# Patient Record
Sex: FEMALE | Race: WHITE | NOT HISPANIC OR LATINO | Employment: UNEMPLOYED | URBAN - NONMETROPOLITAN AREA
[De-identification: names, ages, dates, MRNs, and addresses within clinical notes are randomized per-mention and may not be internally consistent; named-entity substitution may affect disease eponyms.]

---

## 2023-04-18 ENCOUNTER — OFFICE VISIT (OUTPATIENT)
Dept: PRIMARY CARE | Facility: CLINIC | Age: 63
End: 2023-04-18
Payer: MEDICARE

## 2023-04-18 VITALS
OXYGEN SATURATION: 98 % | DIASTOLIC BLOOD PRESSURE: 74 MMHG | HEART RATE: 73 BPM | BODY MASS INDEX: 21.71 KG/M2 | WEIGHT: 118 LBS | HEIGHT: 62 IN | SYSTOLIC BLOOD PRESSURE: 112 MMHG

## 2023-04-18 DIAGNOSIS — R21 SKIN RASH: Primary | ICD-10-CM

## 2023-04-18 PROBLEM — K64.9 HEMORRHOID: Status: ACTIVE | Noted: 2023-04-18

## 2023-04-18 PROBLEM — M25.512 ACUTE PAIN OF LEFT SHOULDER: Status: ACTIVE | Noted: 2023-04-18

## 2023-04-18 PROBLEM — M79.641 PAIN OF RIGHT HAND: Status: ACTIVE | Noted: 2023-04-18

## 2023-04-18 PROBLEM — L25.5 CONTACT DERMATITIS DUE TO PLANT: Status: ACTIVE | Noted: 2023-04-18

## 2023-04-18 PROBLEM — M75.102 LEFT ROTATOR CUFF TEAR: Status: ACTIVE | Noted: 2023-04-18

## 2023-04-18 PROBLEM — K58.9 IBS (IRRITABLE BOWEL SYNDROME): Status: ACTIVE | Noted: 2023-04-18

## 2023-04-18 PROBLEM — E03.9 HYPOTHYROIDISM: Status: ACTIVE | Noted: 2023-04-18

## 2023-04-18 PROBLEM — R05.3 CHRONIC COUGH: Status: ACTIVE | Noted: 2023-04-18

## 2023-04-18 PROBLEM — N88.2 STENOSIS OF CERVIX: Status: ACTIVE | Noted: 2023-04-18

## 2023-04-18 PROBLEM — E78.5 HYPERLIPIDEMIA: Status: ACTIVE | Noted: 2023-04-18

## 2023-04-18 PROBLEM — R35.1 NOCTURIA: Status: ACTIVE | Noted: 2023-04-18

## 2023-04-18 PROBLEM — R53.83 FATIGUE: Status: ACTIVE | Noted: 2023-04-18

## 2023-04-18 PROCEDURE — 1160F RVW MEDS BY RX/DR IN RCRD: CPT | Performed by: FAMILY MEDICINE

## 2023-04-18 PROCEDURE — 1159F MED LIST DOCD IN RCRD: CPT | Performed by: FAMILY MEDICINE

## 2023-04-18 PROCEDURE — 1036F TOBACCO NON-USER: CPT | Performed by: FAMILY MEDICINE

## 2023-04-18 PROCEDURE — 99213 OFFICE O/P EST LOW 20 MIN: CPT | Performed by: FAMILY MEDICINE

## 2023-04-18 RX ORDER — TRIAMCINOLONE ACETONIDE 1 MG/G
CREAM TOPICAL 2 TIMES DAILY
Qty: 80 G | Refills: 1 | Status: SHIPPED | OUTPATIENT
Start: 2023-04-18

## 2023-04-18 RX ORDER — PREDNISONE 10 MG/1
TABLET ORAL
Qty: 30 TABLET | Refills: 1 | Status: SHIPPED | OUTPATIENT
Start: 2023-04-18 | End: 2023-04-30

## 2023-04-18 RX ORDER — LEVOTHYROXINE SODIUM 75 UG/1
1 TABLET ORAL DAILY
COMMUNITY
Start: 2020-02-14 | End: 2023-08-25

## 2023-04-18 RX ORDER — ACETAMINOPHEN AND CODEINE PHOSPHATE 300; 30 MG/1; MG/1
TABLET ORAL EVERY 6 HOURS
COMMUNITY
Start: 2020-02-14

## 2023-04-18 ASSESSMENT — ENCOUNTER SYMPTOMS
NAIL CHANGES: 0
DIARRHEA: 0
VOMITING: 0
SHORTNESS OF BREATH: 0
FATIGUE: 0
RHINORRHEA: 0
COUGH: 0
FEVER: 0
SORE THROAT: 0
EYE PAIN: 0
ANOREXIA: 0

## 2023-04-18 ASSESSMENT — PATIENT HEALTH QUESTIONNAIRE - PHQ9
1. LITTLE INTEREST OR PLEASURE IN DOING THINGS: NOT AT ALL
2. FEELING DOWN, DEPRESSED OR HOPELESS: NOT AT ALL
SUM OF ALL RESPONSES TO PHQ9 QUESTIONS 1 AND 2: 0

## 2023-04-18 NOTE — PROGRESS NOTES
"Subjective   Patient ID: Delaney Ross is a 63 y.o. female who presents for Rash (X month).    Rash  This is a recurrent problem. The current episode started more than 1 month ago. The problem is unchanged. The affected locations include the chest, torso, back, abdomen, left shoulder, left axilla, left arm, right shoulder, right axilla, right arm and right elbow. The rash is characterized by redness and itchiness. She was exposed to chemicals. Pertinent negatives include no anorexia, congestion, cough, diarrhea, eye pain, facial edema, fatigue, fever, joint pain, nail changes, rhinorrhea, shortness of breath, sore throat or vomiting.      2 other members of her family have had a similar rash.  There are no URI symptoms in anyone.  But they had a similar guttate psoriasis like rash.  Also possible that it is just dry skin from using the hot tub.  But I do not think the hot tub use is new.    Scaly slightly raised macular rash over the trunk arms and neck area.    Prednisone taper  As needed triamcinolone  Would not use the hot tub for the next 2 weeks  Lotion daily.    Review of Systems   Constitutional:  Negative for fatigue and fever.   HENT:  Negative for congestion, rhinorrhea and sore throat.    Eyes:  Negative for pain.   Respiratory:  Negative for cough and shortness of breath.    Gastrointestinal:  Negative for anorexia, diarrhea and vomiting.   Musculoskeletal:  Negative for joint pain.   Skin:  Positive for rash. Negative for nail changes.       Objective   /74   Pulse 73   Ht 1.575 m (5' 2\")   Wt 53.5 kg (118 lb)   SpO2 98%   BMI 21.58 kg/m²     Physical Exam  Constitutional:       Appearance: Normal appearance.   HENT:      Head: Normocephalic and atraumatic.   Skin:     General: Skin is warm and dry.   Neurological:      General: No focal deficit present.      Mental Status: She is alert and oriented to person, place, and time.      Gait: Gait normal.   Psychiatric:         Mood and Affect: " Mood normal.         Behavior: Behavior normal.         Thought Content: Thought content normal.         Judgment: Judgment normal.         Assessment/Plan   Problem List Items Addressed This Visit          Other    Skin rash - Primary

## 2023-05-10 ENCOUNTER — TELEPHONE (OUTPATIENT)
Dept: PRIMARY CARE | Facility: CLINIC | Age: 63
End: 2023-05-10
Payer: MEDICARE

## 2023-05-10 DIAGNOSIS — R21 SKIN RASH: ICD-10-CM

## 2023-05-10 NOTE — TELEPHONE ENCOUNTER
RASH IS BACK, HAS ROUND PATCHES THIS TIME.   RASH CAME BACK AFTER BENG IN HIT TUB 1 OR 2 DAYS LATER...  THIS TIME HAS ROUND PATCHES.  [facility]

## 2023-08-22 DIAGNOSIS — E03.9 HYPOTHYROIDISM, UNSPECIFIED TYPE: ICD-10-CM

## 2023-08-25 RX ORDER — LEVOTHYROXINE SODIUM 75 UG/1
75 TABLET ORAL DAILY
Qty: 90 TABLET | Refills: 1 | Status: SHIPPED | OUTPATIENT
Start: 2023-08-25 | End: 2024-03-05

## 2023-12-05 DIAGNOSIS — K64.9 HEMORRHOIDS, UNSPECIFIED HEMORRHOID TYPE: ICD-10-CM

## 2023-12-05 RX ORDER — HYDROCORTISONE ACETATE PRAMOXINE HCL 2.5; 1 G/100G; G/100G
CREAM TOPICAL 2 TIMES DAILY
Qty: 30 G | Refills: 1 | Status: SHIPPED | OUTPATIENT
Start: 2023-12-05 | End: 2024-02-09 | Stop reason: SDUPTHER

## 2023-12-05 RX ORDER — HYDROCORTISONE ACETATE PRAMOXINE HCL 2.5; 1 G/100G; G/100G
CREAM TOPICAL 2 TIMES DAILY
COMMUNITY
End: 2023-12-05 | Stop reason: SDUPTHER

## 2023-12-21 ENCOUNTER — APPOINTMENT (OUTPATIENT)
Dept: SURGERY | Facility: CLINIC | Age: 63
End: 2023-12-21
Payer: MEDICARE

## 2024-01-22 ENCOUNTER — APPOINTMENT (OUTPATIENT)
Dept: OBSTETRICS AND GYNECOLOGY | Facility: CLINIC | Age: 64
End: 2024-01-22
Payer: MEDICARE

## 2024-01-26 ENCOUNTER — APPOINTMENT (OUTPATIENT)
Dept: OBSTETRICS AND GYNECOLOGY | Facility: CLINIC | Age: 64
End: 2024-01-26
Payer: MEDICARE

## 2024-02-07 ENCOUNTER — OFFICE VISIT (OUTPATIENT)
Dept: OBSTETRICS AND GYNECOLOGY | Facility: CLINIC | Age: 64
End: 2024-02-07
Payer: MEDICARE

## 2024-02-07 VITALS
WEIGHT: 121.8 LBS | HEIGHT: 62 IN | BODY MASS INDEX: 22.41 KG/M2 | SYSTOLIC BLOOD PRESSURE: 114 MMHG | DIASTOLIC BLOOD PRESSURE: 62 MMHG

## 2024-02-07 DIAGNOSIS — Z01.419 ENCOUNTER FOR GYNECOLOGICAL EXAMINATION WITHOUT ABNORMAL FINDING: ICD-10-CM

## 2024-02-07 DIAGNOSIS — Z12.4 ENCOUNTER FOR SCREENING FOR CERVICAL CANCER: ICD-10-CM

## 2024-02-07 DIAGNOSIS — Z12.31 ENCOUNTER FOR SCREENING MAMMOGRAM FOR BREAST CANCER: ICD-10-CM

## 2024-02-07 PROCEDURE — 88175 CYTOPATH C/V AUTO FLUID REDO: CPT

## 2024-02-07 PROCEDURE — 1036F TOBACCO NON-USER: CPT | Performed by: OBSTETRICS & GYNECOLOGY

## 2024-02-07 PROCEDURE — 1159F MED LIST DOCD IN RCRD: CPT | Performed by: OBSTETRICS & GYNECOLOGY

## 2024-02-07 PROCEDURE — G0101 CA SCREEN;PELVIC/BREAST EXAM: HCPCS | Performed by: OBSTETRICS & GYNECOLOGY

## 2024-02-07 PROCEDURE — 1160F RVW MEDS BY RX/DR IN RCRD: CPT | Performed by: OBSTETRICS & GYNECOLOGY

## 2024-02-07 NOTE — PROGRESS NOTES
"Delaney Ross is a 63 y.o. female who is here for a routine exam. PCP = Khris Cummings MD    Chief Complaint   Patient presents with    Gynecologic Exam     Patient is here for yearly exam and pap test. Patient does not do regular self breast exams and has no concerns at this time. LMP: Menopause.          Presents for annual exam. She voices no complaints and is doing well. Denies any bowel or bladder problems. Denies any breast problems.  Denies any postmenopausal bleeding.    OB History          0    Para   0    Term   0       0    AB   0    Living   0         SAB   0    IAB   0    Ectopic   0    Multiple   0    Live Births   0                  Past Medical History:   Diagnosis Date    Asymptomatic menopausal state     History of menopause    Encounter for gynecological examination (general) (routine) without abnormal findings 2023    Cotest negative    Other conditions influencing health status     Menstruation    Personal history of other medical treatment 2023    History of mammogram       Past Surgical History:   Procedure Laterality Date    OTHER SURGICAL HISTORY  2020    Finger surgical procedure    OTHER SURGICAL HISTORY  2020    Ankle surgery    OTHER SURGICAL HISTORY  10/25/2022    Colonoscopy    OTHER SURGICAL HISTORY  2022    Rotator cuff repair       Past med hx and past surg hx reviewed and notable for: none    Review of Systems:   Constitutional: No fever or chills  Respiratory: No shortness of breath, or cough  Cardiovascular: No chest pain or syncope  Breasts: No breast pain, no masses, no nipple discharge  Gastrointestinal: No nausea, vomiting, or diarrhea, no abdominal pain  Genitourinary: No dysuria or frequency  Gynecology: Negative except as noted in history of present illness  All other: All other systems reviewed and negative for complaint    Objective   /62   Ht 1.575 m (5' 2\")   Wt 55.2 kg (121 lb 12.8 oz)   BMI 22.28 kg/m² "     PHYSICAL EXAMINATION:  Well-developed, well nourished, in no acute distress, alert and oriented x three, is pleasant and cooperative.   HEENT: Clear. Pupils equal, round and reactive to light and accommodation. Extraocular muscles are intact. Oral mucosa pink without exudate.   NECK: No lymphadenopathy, no thyromegaly.  BREASTS: Symmetric, no palpable masses. No nipple discharge or retraction.  LUNGS: Clear bilaterally.  HEART: Regular rate and rhythm without murmurs.  ABDOMEN: Normoactive bowel sounds, soft and nontender, no guarding or rebound tenderness, no CVA tenderness.  EXTREMITIES: No clubbing, cyanosis or edema.  NEUROLOGIC:  Cranial nerves II-XII grossly intact.  :  Normal external female genitalia, normal vulva, normal vagina. Normal urethral meatus, urethra and bladder. Normal appearing cervix. Normal-sized uterus, no adnexal masses or tenderness. Pap smear performed today.      Actions performed during this visit include:  - Clinical breast exam  - Clinical pelvic exam  -   Orders Placed This Encounter   Procedures    BI mammo bilateral screening tomosynthesis     Standing Status:   Future     Standing Expiration Date:   4/7/2025     Order Specific Question:   Reason for exam:     Answer:   Screening     Order Specific Question:   Radiologist to Determine Optimal Study     Answer:   Yes     Order Specific Question:   [facility]     Answer:   Immediate [1]     Order Specific Question:   Is this exam part of a Research Study? If Yes, link this order to the research study     Answer:   No        Problem List Items Addressed This Visit    None  Visit Diagnoses       Encounter for gynecological examination without abnormal finding        Relevant Orders    THINPREP PAP TEST    Encounter for screening for cervical cancer        Relevant Orders    THINPREP PAP TEST    Encounter for screening mammogram for breast cancer        Relevant Orders    BI mammo bilateral screening tomosynthesis              Provider Impression:  1.  Annual  2.  Screening mammogram    Thank you for coming to your annual exam. Your findings during the exam were normal.  Please return for your next visit in 2 years.

## 2024-02-09 ENCOUNTER — OFFICE VISIT (OUTPATIENT)
Dept: PRIMARY CARE | Facility: CLINIC | Age: 64
End: 2024-02-09
Payer: MEDICARE

## 2024-02-09 VITALS
OXYGEN SATURATION: 97 % | WEIGHT: 120.4 LBS | HEART RATE: 87 BPM | DIASTOLIC BLOOD PRESSURE: 73 MMHG | BODY MASS INDEX: 22.16 KG/M2 | SYSTOLIC BLOOD PRESSURE: 114 MMHG | HEIGHT: 62 IN

## 2024-02-09 DIAGNOSIS — K64.9 HEMORRHOIDS, UNSPECIFIED HEMORRHOID TYPE: ICD-10-CM

## 2024-02-09 DIAGNOSIS — R14.0 ABDOMINAL BLOATING: Primary | ICD-10-CM

## 2024-02-09 DIAGNOSIS — K58.9 IRRITABLE BOWEL SYNDROME, UNSPECIFIED TYPE: ICD-10-CM

## 2024-02-09 PROCEDURE — 1036F TOBACCO NON-USER: CPT | Performed by: FAMILY MEDICINE

## 2024-02-09 PROCEDURE — 1159F MED LIST DOCD IN RCRD: CPT | Performed by: FAMILY MEDICINE

## 2024-02-09 PROCEDURE — 99214 OFFICE O/P EST MOD 30 MIN: CPT | Performed by: FAMILY MEDICINE

## 2024-02-09 PROCEDURE — 1160F RVW MEDS BY RX/DR IN RCRD: CPT | Performed by: FAMILY MEDICINE

## 2024-02-09 RX ORDER — HYDROCORTISONE ACETATE PRAMOXINE HCL 2.5; 1 G/100G; G/100G
CREAM TOPICAL 2 TIMES DAILY
Qty: 30 G | Refills: 3 | Status: SHIPPED | OUTPATIENT
Start: 2024-02-09

## 2024-02-09 ASSESSMENT — PATIENT HEALTH QUESTIONNAIRE - PHQ9
2. FEELING DOWN, DEPRESSED OR HOPELESS: NOT AT ALL
1. LITTLE INTEREST OR PLEASURE IN DOING THINGS: NOT AT ALL
SUM OF ALL RESPONSES TO PHQ9 QUESTIONS 1 AND 2: 0

## 2024-02-09 ASSESSMENT — ENCOUNTER SYMPTOMS
NAUSEA: 0
BLOOD IN STOOL: 0
BACK PAIN: 0
COUGH: 0
NERVOUS/ANXIOUS: 0
DYSURIA: 0
ABDOMINAL PAIN: 1
FATIGUE: 0
CHILLS: 0
HEMATURIA: 0
FEVER: 0
CONSTIPATION: 0
SHORTNESS OF BREATH: 0
DIARRHEA: 0
ABDOMINAL DISTENTION: 1

## 2024-02-09 NOTE — PROGRESS NOTES
"Subjective   Patient ID: Delaney Ross is a 63 y.o. female who presents for Abdominal Pain (x1wk).    Abdominal Pain  Pertinent negatives include no constipation, diarrhea, dysuria, fever, hematuria or nausea.      Upper abdomen discomfort for 2 weeks.  When she does eat certain things like broccoli she does get some distention of her belly.  She also has increased flatulence when that happens.  No change in the abdominal pain when eating urination bowel movement cough deep breath twisting bending.  No rashes on the skin no thoracic back pain.  No nausea vomiting diarrhea constipation, no hematuria or hematochezia.  No melena.    On exam very mild tenderness epigastric area, no rebound.    Sounds like possible viral bug that is very mild.  She is going to work on drinking plenty of fluids lighter foods and avoid things that cause the gassiness.  Pepcid 20 mg twice a day as needed.  If she uses it gets relief maybe she should take it twice a day every day for a while.  No labs etc. at this time  She will call sooner if things get worse, she will also call in 2 weeks if things or not better.    Review of Systems   Constitutional:  Negative for chills, fatigue and fever.   Respiratory:  Negative for cough and shortness of breath.    Gastrointestinal:  Positive for abdominal distention and abdominal pain. Negative for blood in stool, constipation, diarrhea and nausea.   Genitourinary:  Negative for dysuria and hematuria.   Musculoskeletal:  Negative for back pain.   Skin:  Negative for rash.   Psychiatric/Behavioral:  The patient is not nervous/anxious.        Objective   /73   Pulse 87   Ht 1.575 m (5' 2\")   Wt 54.6 kg (120 lb 6.4 oz)   SpO2 97%   BMI 22.02 kg/m²     Physical Exam  Constitutional:       Appearance: Normal appearance.   HENT:      Head: Normocephalic and atraumatic.   Cardiovascular:      Rate and Rhythm: Normal rate and regular rhythm.      Heart sounds: Normal heart sounds.   Pulmonary: "      Effort: Pulmonary effort is normal.      Breath sounds: Normal breath sounds.   Abdominal:      General: There is no distension.      Palpations: Abdomen is soft. There is no mass.      Tenderness: There is abdominal tenderness. There is no guarding or rebound.   Skin:     General: Skin is warm and dry.   Neurological:      General: No focal deficit present.      Mental Status: She is alert and oriented to person, place, and time.   Psychiatric:         Mood and Affect: Mood normal.         Behavior: Behavior normal.         Thought Content: Thought content normal.         Judgment: Judgment normal.         Assessment/Plan   Problem List Items Addressed This Visit             ICD-10-CM    Hemorrhoid K64.9    IBS (irritable bowel syndrome) K58.9     Other Visit Diagnoses         Codes    Abdominal bloating    -  Primary R14.0

## 2024-02-22 LAB
CYTOLOGY CMNT CVX/VAG CYTO-IMP: NORMAL
LAB AP HPV GENOTYPE QUESTION: YES
LAB AP HPV HR: NORMAL
LABORATORY COMMENT REPORT: NORMAL
MENSTRUAL HX REPORTED: NORMAL
PATH REPORT.TOTAL CANCER: NORMAL

## 2024-02-27 ENCOUNTER — APPOINTMENT (OUTPATIENT)
Dept: ORTHOPEDIC SURGERY | Facility: CLINIC | Age: 64
End: 2024-02-27
Payer: MEDICARE

## 2024-02-27 DIAGNOSIS — E03.9 HYPOTHYROIDISM, UNSPECIFIED TYPE: ICD-10-CM

## 2024-03-05 RX ORDER — LEVOTHYROXINE SODIUM 75 UG/1
75 TABLET ORAL DAILY
Qty: 90 TABLET | Refills: 3 | Status: SHIPPED | OUTPATIENT
Start: 2024-03-05 | End: 2025-03-05

## 2024-03-27 ENCOUNTER — OFFICE VISIT (OUTPATIENT)
Dept: UROLOGY | Facility: CLINIC | Age: 64
End: 2024-03-27
Payer: MEDICARE

## 2024-03-27 VITALS — WEIGHT: 125 LBS | BODY MASS INDEX: 22.86 KG/M2 | RESPIRATION RATE: 16 BRPM

## 2024-03-27 DIAGNOSIS — R35.1 NOCTURIA: ICD-10-CM

## 2024-03-27 DIAGNOSIS — N39.0 RECURRENT UTI: ICD-10-CM

## 2024-03-27 PROCEDURE — 1036F TOBACCO NON-USER: CPT | Performed by: UROLOGY

## 2024-03-27 PROCEDURE — 1159F MED LIST DOCD IN RCRD: CPT | Performed by: UROLOGY

## 2024-03-27 PROCEDURE — 1160F RVW MEDS BY RX/DR IN RCRD: CPT | Performed by: UROLOGY

## 2024-03-27 PROCEDURE — 99213 OFFICE O/P EST LOW 20 MIN: CPT | Performed by: UROLOGY

## 2024-03-27 RX ORDER — CIPROFLOXACIN 500 MG/1
500 TABLET ORAL 2 TIMES DAILY
Qty: 60 TABLET | Refills: 1 | Status: SHIPPED | OUTPATIENT
Start: 2024-03-27 | End: 2024-04-26

## 2024-03-27 ASSESSMENT — ENCOUNTER SYMPTOMS
EYES NEGATIVE: 1
NAUSEA: 0
COUGH: 0
ENDOCRINE NEGATIVE: 1
CHILLS: 0
DIFFICULTY URINATING: 0
FEVER: 0
SHORTNESS OF BREATH: 0
ALLERGIC/IMMUNOLOGIC NEGATIVE: 1
PSYCHIATRIC NEGATIVE: 1

## 2024-03-27 NOTE — PROGRESS NOTES
Subjective   Patient ID: Delaney Ross is a 64 y.o. female.    HPI Patient presents to the office today to Re-Establish with a Hx of Recurrent UTI's. Patient has had 1 infection the past year.. PT does have Self start ABX thinks it is Cipro ....No recent imaging... LUT's are mild. Denies frequency and urgency.. Denies dysuria and hematuria.. Nocturia x1. Caffiene does worsen LUT's. Denies HX of kidney stones. No medications for LUT's.. KASANDRA is mild     Review of Systems   Constitutional:  Negative for chills and fever.   HENT: Negative.     Eyes: Negative.    Respiratory:  Negative for cough and shortness of breath.    Cardiovascular:  Negative for chest pain and leg swelling.   Gastrointestinal:  Negative for nausea.   Endocrine: Negative.    Genitourinary:  Negative for difficulty urinating.        Negative except for documented in HPI   Allergic/Immunologic: Negative.    Neurological:         Alert & oriented X 3   Hematological:         Denies blood thinners   Psychiatric/Behavioral: Negative.         Objective   Physical Exam  Vitals and nursing note reviewed.   Pulmonary:      Effort: Pulmonary effort is normal.      Breath sounds: Normal breath sounds.   Abdominal:      Palpations: Abdomen is soft.      Tenderness: There is no abdominal tenderness.   Genitourinary:     Comments: Kidneys non palpable bilaterally  Bladder non palpable or tender  Neurological:      Mental Status: She is alert.         Assessment/Plan   Diagnoses and all orders for this visit:  Nocturia  Recurrent UTI      Treatment options for LUTS reviewed  Discussed timed voiding. Discussed fluid and caffeine intake  Lifestyle change to help prevent UTIs discussed. Encouraged fluid intake.  Self start Cipro Rx given      F/u  1 year with UA

## 2024-04-27 ENCOUNTER — OFFICE VISIT (OUTPATIENT)
Dept: URGENT CARE | Facility: CLINIC | Age: 64
End: 2024-04-27
Payer: MEDICARE

## 2024-04-27 VITALS
DIASTOLIC BLOOD PRESSURE: 85 MMHG | OXYGEN SATURATION: 97 % | RESPIRATION RATE: 18 BRPM | BODY MASS INDEX: 21.16 KG/M2 | HEIGHT: 62 IN | SYSTOLIC BLOOD PRESSURE: 124 MMHG | HEART RATE: 87 BPM | WEIGHT: 115 LBS | TEMPERATURE: 98.7 F

## 2024-04-27 DIAGNOSIS — J06.9 VIRAL URI WITH COUGH: Primary | ICD-10-CM

## 2024-04-27 PROCEDURE — 99213 OFFICE O/P EST LOW 20 MIN: CPT | Performed by: NURSE PRACTITIONER

## 2024-04-27 RX ORDER — AZITHROMYCIN 250 MG/1
TABLET, FILM COATED ORAL
Qty: 6 TABLET | Refills: 0 | Status: SHIPPED | OUTPATIENT
Start: 2024-04-27

## 2024-04-30 ENCOUNTER — APPOINTMENT (OUTPATIENT)
Dept: PRIMARY CARE | Facility: CLINIC | Age: 64
End: 2024-04-30
Payer: MEDICARE

## 2024-06-28 ENCOUNTER — APPOINTMENT (OUTPATIENT)
Dept: RADIOLOGY | Facility: CLINIC | Age: 64
End: 2024-06-28
Payer: MEDICARE

## 2024-07-09 ENCOUNTER — HOSPITAL ENCOUNTER (OUTPATIENT)
Dept: RADIOLOGY | Facility: CLINIC | Age: 64
Discharge: HOME | End: 2024-07-09
Payer: MEDICARE

## 2024-07-09 VITALS — HEIGHT: 62 IN | WEIGHT: 114.99 LBS | BODY MASS INDEX: 21.16 KG/M2

## 2024-07-09 DIAGNOSIS — Z12.31 ENCOUNTER FOR SCREENING MAMMOGRAM FOR BREAST CANCER: ICD-10-CM

## 2024-07-09 PROCEDURE — 77067 SCR MAMMO BI INCL CAD: CPT

## 2024-07-09 PROCEDURE — 77067 SCR MAMMO BI INCL CAD: CPT | Performed by: RADIOLOGY

## 2024-07-09 PROCEDURE — 77063 BREAST TOMOSYNTHESIS BI: CPT | Performed by: RADIOLOGY

## 2024-09-19 ENCOUNTER — APPOINTMENT (OUTPATIENT)
Dept: SURGERY | Facility: CLINIC | Age: 64
End: 2024-09-19
Payer: MEDICARE

## 2024-09-19 VITALS
WEIGHT: 114.6 LBS | HEIGHT: 62 IN | DIASTOLIC BLOOD PRESSURE: 78 MMHG | HEART RATE: 76 BPM | BODY MASS INDEX: 21.09 KG/M2 | SYSTOLIC BLOOD PRESSURE: 132 MMHG

## 2024-09-19 DIAGNOSIS — K64.5 THROMBOSED EXTERNAL HEMORRHOID: Primary | ICD-10-CM

## 2024-09-19 PROCEDURE — 1159F MED LIST DOCD IN RCRD: CPT | Performed by: SURGERY

## 2024-09-19 PROCEDURE — 3008F BODY MASS INDEX DOCD: CPT | Performed by: SURGERY

## 2024-09-19 PROCEDURE — 1036F TOBACCO NON-USER: CPT | Performed by: SURGERY

## 2024-09-19 PROCEDURE — 99203 OFFICE O/P NEW LOW 30 MIN: CPT | Performed by: SURGERY

## 2024-09-19 NOTE — PROGRESS NOTES
General Surgery Consultation    Patient: Delaney Ross  : 1960  MRN: [id number]  Date of Consultation: 24    Primary Care Provider: Khris Cummings MD  Referring Provider: Self-referral    Chief Complaint: Perianal pain    History of Present Illness: Delaney Ross is a 64 y.o. old female seen by self-referral for evaluation of hemorrhoids.  She reports that over the past several years she intermittently has trouble with hemorrhoids.  Her main complaint is that they intermittently swell and become sore.  Most recently, about 2 weeks ago she had an enlarged hemorrhoid.  This was external.  She has tried to manually reduce this in the past but it comes back out.  She reports that it was large, swollen, sore, and after a day or 2 started to seep blood.  Prior to onset, she was constipated.  Typically she has bowel movements daily after having a cup of coffee.  She is not currently taking any stool softeners, fiber supplements, or laxatives.  She used to take Metamucil regularly but says she cannot remember why she stopped taking it.  She is up-to-date on colonoscopy.  Her last colonoscopy was 2022 and this was normal.  5-year surveillance was recommended as her mother had colon cancer, although this was diagnosed at age 93.    Medical History:  Thrombosed external hemorrhoid    Surgical History:  Colonoscopy by Dr. Burk, 10/24/2022, normal colonoscopy, 5-year surveillance recommended.  Hand surgery  Ankle surgery  Rotator cuff repair    Home Medications:  Prior to Admission medications    Medication Sig Start Date End Date Taking? Authorizing Provider   acetaminophen-codeine (Tylenol w/ Codeine #3) 300-30 mg tablet Take by mouth every 6 hours. 20   Historical Provider, MD   azithromycin (Zithromax Z-Tomas) 250 mg tablet Take 2 tablets by mouth at once on day 1, then 1 tablet once a day on days 2-5. Take with a meal. 24   MICHELLE Forrester-CNP   hydrocortisone-pramoxine  "(Analpram-HC) 2.5-1 % cream Insert into the rectum 2 times a day. 2/9/24   Khris Cummings MD   levothyroxine (Synthroid, Levoxyl) 75 mcg tablet Take 1 tablet (75 mcg) by mouth once daily. 3/5/24 3/5/25  Khris Cummings MD   triamcinolone (Kenalog) 0.1 % cream Apply topically 2 times a day. Apply to affected area 1-2 times daily as needed. 4/18/23   Khris Cummings MD     Allergies:  Allergies   Allergen Reactions    Adhesive Tape-Silicones Rash     Family History:   Mother with history of colon cancer, diagnosed at age 93.  Father with history of lung cancer.  Brother with esophageal cancer.  Both parents with hypertension.    Social History:  Non-smoker.  Drinks a glass of wine nightly with dinner.  No drug use.    ROS:  Constitutional:  no fever, sweats, and chills  Cardiovascular: No chest pain  Respiratory: No cough or shortness of breath  Gastrointestinal: + Perianal swelling/discomfort, blood per rectum  Genitourinary: no dysuria  Musculoskeletal: no weakness or swelling  Integumentary: + Stress rash on left hip  Neurological: no confusion  Endocrine: no heat or cold intolerance  Heme/Lymph: no easy bruising or bleeding    Objective:  /78   Pulse 76   Ht 1.575 m (5' 2.01\")   Wt 52 kg (114 lb 9.6 oz)   BMI 20.95 kg/m²     Physical Exam:  Constitutional: No acute distress, conversant, pleasant  Neurologic: alert and oriented  Psych: appropriate affect  Ears, Nose, Mouth and Throat: mucus membranes moist  Pulmonary: No labored breathing  Cardiovascular: Regular rate and rhythm  Abdomen: Nondistended, BMI 21  Rectal: She has a small external hemorrhoidal skin tag in the right anterior location.  This has a central ulcerated punctum consistent with a resolving thrombosed external hemorrhoid that his already self drained.  On digital rectal exam she has normal tone and no palpable masses.  Musculoskeletal: Moves all extremities, no edema  Skin: no jaundice    Labs/Imaging:   No recent/pertinent " labs or imaging available for review.    Assessment and Plan: Delaney Ross is a 64 y.o. old female with a thrombosed external hemorrhoid.  This has been present for approximately 2 weeks and has already self drained.  Therefore, there is no indication for surgical intervention at this time.  We discussed that she will likely have a residual external hemorrhoidal skin tag in this area.  The only way to get rid of that would be an excisional hemorrhoidectomy, but I do not necessarily recommend that at this time.  We discussed the importance of keeping bowel movements soft and regular and avoiding straining to prevent recurrence.  She can drink water throughout the day and take fiber supplementation to help with this.  We discussed that if she were to have another episode of thrombosis, there is benefit of having it incised and drained within the first 72 hours of it thrombosing.  She can continue with sitz bath's for symptomatic relief.  Of note, since she has seen bleeding that is new since time of colonoscopy, I offered to repeat colonoscopy just to assure no additional proximal source of bleeding.  She is not interested in scheduling that at this time.  I will see her on an as-needed basis.    Latesha Patel MD  9/19/2024

## 2025-03-19 DIAGNOSIS — E03.9 HYPOTHYROIDISM, UNSPECIFIED TYPE: ICD-10-CM

## 2025-03-20 RX ORDER — LEVOTHYROXINE SODIUM 75 UG/1
75 TABLET ORAL DAILY
Qty: 90 TABLET | Refills: 3 | OUTPATIENT
Start: 2025-03-20

## 2025-03-28 ENCOUNTER — OFFICE VISIT (OUTPATIENT)
Age: 65
End: 2025-03-28
Payer: MEDICARE

## 2025-03-28 VITALS
OXYGEN SATURATION: 96 % | SYSTOLIC BLOOD PRESSURE: 106 MMHG | HEART RATE: 74 BPM | BODY MASS INDEX: 21.86 KG/M2 | DIASTOLIC BLOOD PRESSURE: 62 MMHG | HEIGHT: 62 IN | WEIGHT: 118.8 LBS

## 2025-03-28 DIAGNOSIS — Z00.00 ROUTINE GENERAL MEDICAL EXAMINATION AT HEALTH CARE FACILITY: Primary | ICD-10-CM

## 2025-03-28 DIAGNOSIS — Z12.31 ENCOUNTER FOR SCREENING MAMMOGRAM FOR BREAST CANCER: ICD-10-CM

## 2025-03-28 DIAGNOSIS — E03.9 HYPOTHYROIDISM, UNSPECIFIED TYPE: ICD-10-CM

## 2025-03-28 DIAGNOSIS — R53.83 FATIGUE, UNSPECIFIED TYPE: ICD-10-CM

## 2025-03-28 PROCEDURE — 99214 OFFICE O/P EST MOD 30 MIN: CPT | Performed by: FAMILY MEDICINE

## 2025-03-28 PROCEDURE — 1160F RVW MEDS BY RX/DR IN RCRD: CPT | Performed by: FAMILY MEDICINE

## 2025-03-28 PROCEDURE — 1036F TOBACCO NON-USER: CPT | Performed by: FAMILY MEDICINE

## 2025-03-28 PROCEDURE — 3008F BODY MASS INDEX DOCD: CPT | Performed by: FAMILY MEDICINE

## 2025-03-28 PROCEDURE — 1159F MED LIST DOCD IN RCRD: CPT | Performed by: FAMILY MEDICINE

## 2025-03-28 PROCEDURE — G0439 PPPS, SUBSEQ VISIT: HCPCS | Performed by: FAMILY MEDICINE

## 2025-03-28 PROCEDURE — 1170F FXNL STATUS ASSESSED: CPT | Performed by: FAMILY MEDICINE

## 2025-03-28 RX ORDER — LEVOTHYROXINE SODIUM 75 UG/1
75 TABLET ORAL DAILY
Qty: 90 TABLET | Refills: 3 | Status: CANCELLED | OUTPATIENT
Start: 2025-03-28 | End: 2026-03-28

## 2025-03-28 ASSESSMENT — PATIENT HEALTH QUESTIONNAIRE - PHQ9
2. FEELING DOWN, DEPRESSED OR HOPELESS: NOT AT ALL
SUM OF ALL RESPONSES TO PHQ9 QUESTIONS 1 AND 2: 0
1. LITTLE INTEREST OR PLEASURE IN DOING THINGS: NOT AT ALL

## 2025-03-28 ASSESSMENT — ENCOUNTER SYMPTOMS
SLEEP DISTURBANCE: 0
ABDOMINAL PAIN: 0
CONSTIPATION: 0
NERVOUS/ANXIOUS: 0
SHORTNESS OF BREATH: 0
HEADACHES: 0
BLOOD IN STOOL: 0
FATIGUE: 1
OCCASIONAL FEELINGS OF UNSTEADINESS: 0
PALPITATIONS: 0
COUGH: 0
LOSS OF SENSATION IN FEET: 0
DEPRESSION: 0

## 2025-03-28 ASSESSMENT — ACTIVITIES OF DAILY LIVING (ADL)
BATHING: INDEPENDENT
DRESSING: INDEPENDENT
MANAGING_FINANCES: INDEPENDENT
GROCERY_SHOPPING: INDEPENDENT
DOING_HOUSEWORK: INDEPENDENT
TAKING_MEDICATION: INDEPENDENT

## 2025-03-28 NOTE — PROGRESS NOTES
"Subjective   Reason for Visit: Delaney Ross is an 65 y.o. female here for a Medicare Wellness visit.     Past Medical, Surgical, and Family History reviewed and updated in chart.    Reviewed all medications by prescribing practitioner or clinical pharmacist (such as prescriptions, OTCs, herbal therapies and supplements) and documented in the medical record.    HPI  MMG 2024.   Colon 2022, + FH, 5 years.  New mammogram order done.  No masses in either breast no pain in either breast.  Symptoms are stable on thyroid medication.  Needs TSH level today  Bit of tiredness had CBC CMP    Has been says she stops breathing at night but there is no loud aerobic snoring, she feels rested in the morning, she does not have any daytime tiredness she gets a high good sleep score with her watch most nights discussed they will talk about it, if she wants to will start with a home sleep study    Patient Care Team:  Khris Cummings MD as PCP - General (Family Medicine)  Khris Cummings MD as PCP - [facility] Attributed Provider     Review of Systems   Constitutional:  Positive for fatigue.   HENT:  Positive for tinnitus. Negative for hearing loss.    Eyes:  Negative for visual disturbance.   Respiratory:  Negative for cough and shortness of breath.    Cardiovascular:  Negative for chest pain and palpitations.   Gastrointestinal:  Negative for abdominal pain, blood in stool and constipation.   Endocrine: Negative for cold intolerance and heat intolerance.   Skin:  Negative for rash.   Neurological:  Negative for headaches.   Psychiatric/Behavioral:  Negative for sleep disturbance. The patient is not nervous/anxious.        Objective   Vitals:  /62   Pulse 74   Ht 1.575 m (5' 2\")   Wt 53.9 kg (118 lb 12.8 oz)   SpO2 96%   BMI 21.73 kg/m²       Physical Exam  Constitutional:       Appearance: Normal appearance.   HENT:      Head: Normocephalic and atraumatic.   Cardiovascular:      Rate and Rhythm: Normal rate and " regular rhythm.      Heart sounds: Normal heart sounds.   Pulmonary:      Effort: Pulmonary effort is normal.      Breath sounds: Normal breath sounds.   Skin:     General: Skin is warm and dry.   Neurological:      General: No focal deficit present.      Mental Status: She is alert and oriented to person, place, and time.   Psychiatric:         Mood and Affect: Mood normal.         Behavior: Behavior normal.         Thought Content: Thought content normal.         Judgment: Judgment normal.       Assessment & Plan  Hypothyroidism, unspecified type    Orders:    Thyroid Stimulating Hormone; Future    Routine general medical examination at health care facility    Orders:    1 Year Follow Up In Primary Care - Wellness Exam; Future    Encounter for screening mammogram for breast cancer    Orders:    BI mammo bilateral screening tomosynthesis; Future    Fatigue, unspecified type    Orders:    CBC; Future    Comprehensive Metabolic Panel; Future

## 2025-03-31 ENCOUNTER — APPOINTMENT (OUTPATIENT)
Age: 65
End: 2025-03-31
Payer: MEDICARE

## 2025-04-02 LAB
ALBUMIN SERPL-MCNC: 4.5 G/DL (ref 3.6–5.1)
ALP SERPL-CCNC: 96 U/L (ref 37–153)
ALT SERPL-CCNC: 11 U/L (ref 6–29)
ANION GAP SERPL CALCULATED.4IONS-SCNC: 8 MMOL/L (CALC) (ref 7–17)
AST SERPL-CCNC: 21 U/L (ref 10–35)
BILIRUB SERPL-MCNC: 0.7 MG/DL (ref 0.2–1.2)
BUN SERPL-MCNC: 18 MG/DL (ref 7–25)
CALCIUM SERPL-MCNC: 9.3 MG/DL (ref 8.6–10.4)
CHLORIDE SERPL-SCNC: 104 MMOL/L (ref 98–110)
CO2 SERPL-SCNC: 29 MMOL/L (ref 20–32)
CREAT SERPL-MCNC: 0.64 MG/DL (ref 0.5–1.05)
EGFRCR SERPLBLD CKD-EPI 2021: 97 ML/MIN/1.73M2
ERYTHROCYTE [DISTWIDTH] IN BLOOD BY AUTOMATED COUNT: 13.2 % (ref 11–15)
GLUCOSE SERPL-MCNC: 81 MG/DL (ref 65–99)
HCT VFR BLD AUTO: 47.1 % (ref 35–45)
HGB BLD-MCNC: 15.6 G/DL (ref 11.7–15.5)
MCH RBC QN AUTO: 29.9 PG (ref 27–33)
MCHC RBC AUTO-ENTMCNC: 33.1 G/DL (ref 32–36)
MCV RBC AUTO: 90.2 FL (ref 80–100)
PLATELET # BLD AUTO: 224 THOUSAND/UL (ref 140–400)
PMV BLD REES-ECKER: 10.6 FL (ref 7.5–12.5)
POTASSIUM SERPL-SCNC: 4.6 MMOL/L (ref 3.5–5.3)
PROT SERPL-MCNC: 7 G/DL (ref 6.1–8.1)
RBC # BLD AUTO: 5.22 MILLION/UL (ref 3.8–5.1)
SODIUM SERPL-SCNC: 141 MMOL/L (ref 135–146)
TSH SERPL-ACNC: 2.37 MIU/L (ref 0.4–4.5)
WBC # BLD AUTO: 4.2 THOUSAND/UL (ref 3.8–10.8)

## 2025-05-19 DIAGNOSIS — E03.9 HYPOTHYROIDISM, UNSPECIFIED TYPE: ICD-10-CM

## 2025-05-19 RX ORDER — LEVOTHYROXINE SODIUM 75 UG/1
75 TABLET ORAL DAILY
Qty: 90 TABLET | Refills: 3 | Status: SHIPPED | OUTPATIENT
Start: 2025-05-19 | End: 2026-05-19

## 2025-07-11 ENCOUNTER — HOSPITAL ENCOUNTER (OUTPATIENT)
Dept: RADIOLOGY | Facility: CLINIC | Age: 65
Discharge: HOME | End: 2025-07-11
Payer: MEDICARE

## 2025-07-11 VITALS — HEIGHT: 62 IN | WEIGHT: 118.78 LBS | BODY MASS INDEX: 21.86 KG/M2

## 2025-07-11 DIAGNOSIS — Z12.31 ENCOUNTER FOR SCREENING MAMMOGRAM FOR BREAST CANCER: ICD-10-CM

## 2025-07-11 PROCEDURE — 77063 BREAST TOMOSYNTHESIS BI: CPT | Performed by: RADIOLOGY

## 2025-07-11 PROCEDURE — 77067 SCR MAMMO BI INCL CAD: CPT

## 2025-07-11 PROCEDURE — 77067 SCR MAMMO BI INCL CAD: CPT | Performed by: RADIOLOGY

## 2025-07-31 ENCOUNTER — OFFICE VISIT (OUTPATIENT)
Age: 65
End: 2025-07-31
Payer: MEDICARE

## 2025-07-31 VITALS
OXYGEN SATURATION: 95 % | HEIGHT: 62 IN | SYSTOLIC BLOOD PRESSURE: 134 MMHG | WEIGHT: 111.8 LBS | HEART RATE: 74 BPM | DIASTOLIC BLOOD PRESSURE: 74 MMHG | BODY MASS INDEX: 20.57 KG/M2 | TEMPERATURE: 98.2 F

## 2025-07-31 DIAGNOSIS — J40 BRONCHITIS: Primary | ICD-10-CM

## 2025-07-31 PROCEDURE — 1160F RVW MEDS BY RX/DR IN RCRD: CPT | Performed by: FAMILY MEDICINE

## 2025-07-31 PROCEDURE — 3008F BODY MASS INDEX DOCD: CPT | Performed by: FAMILY MEDICINE

## 2025-07-31 PROCEDURE — 99213 OFFICE O/P EST LOW 20 MIN: CPT | Performed by: FAMILY MEDICINE

## 2025-07-31 PROCEDURE — 1036F TOBACCO NON-USER: CPT | Performed by: FAMILY MEDICINE

## 2025-07-31 PROCEDURE — 1159F MED LIST DOCD IN RCRD: CPT | Performed by: FAMILY MEDICINE

## 2025-07-31 PROCEDURE — 1124F ACP DISCUSS-NO DSCNMKR DOCD: CPT | Performed by: FAMILY MEDICINE

## 2025-07-31 RX ORDER — PREDNISONE 20 MG/1
40 TABLET ORAL DAILY
Qty: 10 TABLET | Refills: 0 | Status: SHIPPED | OUTPATIENT
Start: 2025-07-31 | End: 2025-08-05

## 2025-07-31 RX ORDER — ALBUTEROL SULFATE 90 UG/1
2 INHALANT RESPIRATORY (INHALATION) EVERY 6 HOURS PRN
Qty: 18 G | Refills: 11 | Status: SHIPPED | OUTPATIENT
Start: 2025-07-31 | End: 2026-07-31

## 2025-07-31 RX ORDER — AZITHROMYCIN 250 MG/1
TABLET, FILM COATED ORAL
Qty: 6 TABLET | Refills: 0 | Status: SHIPPED | OUTPATIENT
Start: 2025-07-31 | End: 2025-08-05

## 2025-07-31 ASSESSMENT — ENCOUNTER SYMPTOMS
DEPRESSION: 0
SHORTNESS OF BREATH: 0
NECK PAIN: 0
DIARRHEA: 0
NAUSEA: 0
STRIDOR: 0
FATIGUE: 0
LOSS OF SENSATION IN FEET: 0
COUGH: 1
FEVER: 0
APPETITE CHANGE: 0
CHILLS: 0
CHEST TIGHTNESS: 0
VOMITING: 0
OCCASIONAL FEELINGS OF UNSTEADINESS: 0
HEADACHES: 0
ABDOMINAL PAIN: 0
TROUBLE SWALLOWING: 1
SWOLLEN GLANDS: 0
SORE THROAT: 1
ACTIVITY CHANGE: 0
PALPITATIONS: 0
CONSTIPATION: 0
HOARSE VOICE: 1
WHEEZING: 1

## 2025-07-31 ASSESSMENT — PATIENT HEALTH QUESTIONNAIRE - PHQ9
SUM OF ALL RESPONSES TO PHQ9 QUESTIONS 1 AND 2: 0
2. FEELING DOWN, DEPRESSED OR HOPELESS: NOT AT ALL
1. LITTLE INTEREST OR PLEASURE IN DOING THINGS: NOT AT ALL

## 2025-07-31 NOTE — PROGRESS NOTES
"Subjective   Patient ID: Delaney Ross is a 65 y.o. female who presents for Cough (Dry raspy cough- lost her voice since Monday- cough for two weeks- taking otc cough syrup and mucinex- feels \"fine\").    Sore Throat   This is a new problem. The current episode started in the past 7 days. The problem has been waxing and waning. Neither side of throat is experiencing more pain than the other. The pain is at a severity of 5/10. The pain is mild. Associated symptoms include coughing, a hoarse voice and trouble swallowing. Pertinent negatives include no abdominal pain, congestion, diarrhea, ear discharge, ear pain, headaches, plugged ear sensation, neck pain, shortness of breath, stridor, swollen glands or vomiting.      Coughing for 2 weeks, no SOB/wheezing, some sputum over the past few days, lost voice 4 days ago, no nasal congestion/RN/PND, no F/C/S, some ST in AM, sleeping reclined in bed  OTC cough medicine    Review of Systems   Constitutional:  Negative for activity change, appetite change, chills, fatigue and fever.   HENT:  Positive for hoarse voice, sore throat and trouble swallowing. Negative for congestion, ear discharge and ear pain.    Respiratory:  Positive for cough and wheezing. Negative for chest tightness, shortness of breath and stridor.    Cardiovascular:  Negative for chest pain, palpitations and leg swelling.   Gastrointestinal:  Negative for abdominal pain, constipation, diarrhea, nausea and vomiting.   Musculoskeletal:  Negative for neck pain.   Neurological:  Negative for headaches.       Objective   /74   Pulse 74   Temp 36.8 °C (98.2 °F)   Ht 1.575 m (5' 2\")   Wt 50.7 kg (111 lb 12.8 oz)   SpO2 95%   BMI 20.45 kg/m²     Physical Exam  Vitals and nursing note reviewed.   Constitutional:       Appearance: Normal appearance.   HENT:      Head: Normocephalic and atraumatic.      Right Ear: Tympanic membrane, ear canal and external ear normal.      Left Ear: Tympanic membrane, ear " canal and external ear normal.      Nose: Nose normal.      Mouth/Throat:      Mouth: Mucous membranes are moist.      Pharynx: Oropharynx is clear.     Cardiovascular:      Rate and Rhythm: Normal rate and regular rhythm.      Pulses: Normal pulses.      Heart sounds: Normal heart sounds.   Pulmonary:      Effort: Pulmonary effort is normal.      Breath sounds: Wheezing present.     Musculoskeletal:      Cervical back: Normal range of motion and neck supple.     Skin:     General: Skin is warm and dry.      Capillary Refill: Capillary refill takes less than 2 seconds.     Neurological:      Mental Status: She is alert.     Psychiatric:         Mood and Affect: Mood normal.         Behavior: Behavior normal.         Assessment/Plan   Problem List Items Addressed This Visit    None  Visit Diagnoses         Codes      Bronchitis    -  Primary J40    Advised about over-the-counter medication, prescription for albuterol as needed, azithromycin and prednisone for 5 days, call if not improving in 1 week     Relevant Medications    azithromycin (Zithromax) 250 mg tablet    predniSONE (Deltasone) 20 mg tablet    albuterol 90 mcg/actuation inhaler

## 2025-07-31 NOTE — PATIENT INSTRUCTIONS
Use acetaminophen or Tylenol alternating with ibuprofen every 4 hours for aches, pains and fevers.  Use over-the-counter Delsym or Mucinex DM for cough and congestion.  Be sure to drink plenty of fluids, rest if you can, use the antibiotic and steroid as directed, inhaler as needed up to 3 times a day

## 2025-08-07 ENCOUNTER — TELEPHONE (OUTPATIENT)
Age: 65
End: 2025-08-07
Payer: MEDICARE

## 2025-08-07 DIAGNOSIS — R05.1 ACUTE COUGH: Primary | ICD-10-CM

## 2025-08-07 RX ORDER — PREDNISONE 10 MG/1
TABLET ORAL
Qty: 30 TABLET | Refills: 0 | Status: SHIPPED | OUTPATIENT
Start: 2025-08-07 | End: 2025-08-19

## 2025-08-07 NOTE — TELEPHONE ENCOUNTER
IN LAST THURSDAY, FINISHED Z SCOTT AND PREDNISONE ON MONDAY.     COUGH IS A LITTLE LESS.   STILL WHEEZING     PLEASE ADVISE     [facility]